# Patient Record
Sex: FEMALE | Race: WHITE | NOT HISPANIC OR LATINO
[De-identification: names, ages, dates, MRNs, and addresses within clinical notes are randomized per-mention and may not be internally consistent; named-entity substitution may affect disease eponyms.]

---

## 2019-03-03 ENCOUNTER — TRANSCRIPTION ENCOUNTER (OUTPATIENT)
Age: 39
End: 2019-03-03

## 2019-06-03 ENCOUNTER — APPOINTMENT (OUTPATIENT)
Dept: INTERNAL MEDICINE | Facility: CLINIC | Age: 39
End: 2019-06-03
Payer: COMMERCIAL

## 2019-06-03 VITALS
HEIGHT: 61 IN | DIASTOLIC BLOOD PRESSURE: 67 MMHG | RESPIRATION RATE: 12 BRPM | OXYGEN SATURATION: 98 % | SYSTOLIC BLOOD PRESSURE: 104 MMHG | BODY MASS INDEX: 27.19 KG/M2 | TEMPERATURE: 98.8 F | HEART RATE: 76 BPM | WEIGHT: 144 LBS

## 2019-06-03 PROCEDURE — 99395 PREV VISIT EST AGE 18-39: CPT

## 2019-06-05 LAB
25(OH)D3 SERPL-MCNC: 41.1 NG/ML
ALBUMIN SERPL ELPH-MCNC: 4.4 G/DL
ALP BLD-CCNC: 74 U/L
ALT SERPL-CCNC: 14 U/L
ANION GAP SERPL CALC-SCNC: 12 MMOL/L
APPEARANCE: CLEAR
AST SERPL-CCNC: 15 U/L
BILIRUB SERPL-MCNC: 0.4 MG/DL
BILIRUBIN URINE: NEGATIVE
BLOOD URINE: NEGATIVE
BUN SERPL-MCNC: 14 MG/DL
CALCIUM SERPL-MCNC: 9.1 MG/DL
CHLORIDE SERPL-SCNC: 104 MMOL/L
CHOLEST SERPL-MCNC: 150 MG/DL
CHOLEST/HDLC SERPL: 3.4 RATIO
CO2 SERPL-SCNC: 23 MMOL/L
COLOR: YELLOW
CREAT SERPL-MCNC: 0.89 MG/DL
ESTIMATED AVERAGE GLUCOSE: 100 MG/DL
GLUCOSE QUALITATIVE U: NEGATIVE
GLUCOSE SERPL-MCNC: 84 MG/DL
HBA1C MFR BLD HPLC: 5.1 %
HDLC SERPL-MCNC: 44 MG/DL
KETONES URINE: NEGATIVE
LDLC SERPL CALC-MCNC: 85 MG/DL
LEUKOCYTE ESTERASE URINE: NEGATIVE
NITRITE URINE: NEGATIVE
PH URINE: 5.5
POTASSIUM SERPL-SCNC: 3.8 MMOL/L
PROT SERPL-MCNC: 7.3 G/DL
PROTEIN URINE: NORMAL
SODIUM SERPL-SCNC: 139 MMOL/L
SPECIFIC GRAVITY URINE: 1.03
TRIGL SERPL-MCNC: 106 MG/DL
TSH SERPL-ACNC: 2.17 UIU/ML
UROBILINOGEN URINE: NORMAL
VIT B12 SERPL-MCNC: 328 PG/ML

## 2019-06-09 NOTE — ASSESSMENT
[FreeTextEntry1] : Physical\par blood work ordered\par Pt to schedule eulogio't with her gyn\par \par acetazolamide for altitude sickness prescribed\par \par follow up in one week for lab results\par

## 2019-06-09 NOTE — HISTORY OF PRESENT ILLNESS
[de-identified] : 40 yo female, presents for annual physical\par States she will be going to [place] for vacation and needs medicine for altitude prophylaxis\par

## 2019-06-09 NOTE — HEALTH RISK ASSESSMENT
[With Family] : lives with family [Employed] : employed [Single] : single [# Of Children ___] : has [unfilled] children [Sexually Active] : sexually active [] : No [PapSmearDate] : July 2018 [de-identified] : w [FreeTextEntry2] :

## 2019-06-13 DIAGNOSIS — D64.9 ANEMIA, UNSPECIFIED: ICD-10-CM

## 2019-06-14 PROBLEM — D64.9 ANEMIA: Status: ACTIVE | Noted: 2019-06-14

## 2019-06-14 LAB
BASOPHILS # BLD AUTO: 0.05 K/UL
BASOPHILS NFR BLD AUTO: 1.1 %
EOSINOPHIL # BLD AUTO: 0.04 K/UL
EOSINOPHIL NFR BLD AUTO: 0.9 %
HCT VFR BLD CALC: 36.7 %
HGB BLD-MCNC: 10.6 G/DL
IMM GRANULOCYTES NFR BLD AUTO: 0.2 %
LYMPHOCYTES # BLD AUTO: 1.2 K/UL
LYMPHOCYTES NFR BLD AUTO: 26.3 %
MAN DIFF?: NORMAL
MCHC RBC-ENTMCNC: 22.6 PG
MCHC RBC-ENTMCNC: 28.9 GM/DL
MCV RBC AUTO: 78.3 FL
MONOCYTES # BLD AUTO: 0.55 K/UL
MONOCYTES NFR BLD AUTO: 12.1 %
NEUTROPHILS # BLD AUTO: 2.71 K/UL
NEUTROPHILS NFR BLD AUTO: 59.4 %
PLATELET # BLD AUTO: 183 K/UL
RBC # BLD: 4.69 M/UL
RBC # FLD: 15.7 %
WBC # FLD AUTO: 4.56 K/UL

## 2020-10-26 ENCOUNTER — TRANSCRIPTION ENCOUNTER (OUTPATIENT)
Age: 40
End: 2020-10-26

## 2021-01-04 ENCOUNTER — LABORATORY RESULT (OUTPATIENT)
Age: 41
End: 2021-01-04

## 2021-01-04 ENCOUNTER — APPOINTMENT (OUTPATIENT)
Dept: INTERNAL MEDICINE | Facility: CLINIC | Age: 41
End: 2021-01-04
Payer: COMMERCIAL

## 2021-01-04 VITALS
OXYGEN SATURATION: 99 % | RESPIRATION RATE: 12 BRPM | WEIGHT: 151 LBS | BODY MASS INDEX: 28.53 KG/M2 | SYSTOLIC BLOOD PRESSURE: 115 MMHG | HEART RATE: 60 BPM | TEMPERATURE: 97.7 F | DIASTOLIC BLOOD PRESSURE: 74 MMHG

## 2021-01-04 DIAGNOSIS — Z00.00 ENCOUNTER FOR GENERAL ADULT MEDICAL EXAMINATION W/OUT ABNORMAL FINDINGS: ICD-10-CM

## 2021-01-04 PROCEDURE — 99395 PREV VISIT EST AGE 18-39: CPT

## 2021-01-04 PROCEDURE — 99072 ADDL SUPL MATRL&STAF TM PHE: CPT

## 2021-01-04 RX ORDER — ACETAZOLAMIDE 250 MG/1
250 TABLET ORAL TWICE DAILY
Qty: 30 | Refills: 0 | Status: DISCONTINUED | COMMUNITY
Start: 2019-06-03 | End: 2021-01-04

## 2021-01-05 NOTE — PHYSICAL EXAM
[No Acute Distress] : no acute distress [Well Nourished] : well nourished [Well Developed] : well developed [Well-Appearing] : well-appearing [Normal Sclera/Conjunctiva] : normal sclera/conjunctiva [PERRL] : pupils equal round and reactive to light [EOMI] : extraocular movements intact [Normal Outer Ear/Nose] : the outer ears and nose were normal in appearance [Normal Oropharynx] : the oropharynx was normal [Normal TMs] : both tympanic membranes were normal [Supple] : supple [No Respiratory Distress] : no respiratory distress  [No Accessory Muscle Use] : no accessory muscle use [Clear to Auscultation] : lungs were clear to auscultation bilaterally [Normal Rate] : normal rate  [Regular Rhythm] : with a regular rhythm [Normal S1, S2] : normal S1 and S2 [No Edema] : there was no peripheral edema [No Extremity Clubbing/Cyanosis] : no extremity clubbing/cyanosis [Soft] : abdomen soft [Non Tender] : non-tender [Non-distended] : non-distended [Normal Bowel Sounds] : normal bowel sounds [Normal Posterior Cervical Nodes] : no posterior cervical lymphadenopathy [Normal Anterior Cervical Nodes] : no anterior cervical lymphadenopathy [No CVA Tenderness] : no CVA  tenderness [No Spinal Tenderness] : no spinal tenderness [No Joint Swelling] : no joint swelling [Grossly Normal Strength/Tone] : grossly normal strength/tone [No Rash] : no rash [Coordination Grossly Intact] : coordination grossly intact [No Focal Deficits] : no focal deficits [Normal Gait] : normal gait [Normal Affect] : the affect was normal [Alert and Oriented x3] : oriented to person, place, and time [Normal Insight/Judgement] : insight and judgment were intact [No JVD] : no jugular venous distention [No Lymphadenopathy] : no lymphadenopathy [Thyroid Normal, No Nodules] : the thyroid was normal and there were no nodules present

## 2021-01-09 NOTE — HISTORY OF PRESENT ILLNESS
[de-identified] : \par Ms. SHAHBAZ COOPER is a 40 year old female presents for annual physical\par She is doing well. \par Denies SOB, chest pain, abdominal pain, N/V/D, leg swelling, headache, dizziness \par Offers no complaints\par She had botox last week, and was told at that time to have her thyroid checked.

## 2021-01-09 NOTE — ASSESSMENT
[FreeTextEntry1] : \par Annual Physical Exam\par UTD with GYN/ PAP\par complete bloodwork today, including STDs and COVID antibodies\par cont. exercise and healthy diet\par \par ?Enlarged Thyroid\par US Thyroid\par will check TSH today\par \par F/U 1 week for lab results

## 2021-01-09 NOTE — HEALTH RISK ASSESSMENT
[Patient reported PAP Smear was normal] : Patient reported PAP Smear was normal [With Family] : lives with family [Employed] : employed [# Of Children ___] : has [unfilled] children [] : No [PapSmearDate] : 2020 [FreeTextEntry2] :

## 2021-01-11 LAB
25(OH)D3 SERPL-MCNC: 32.2 NG/ML
ALBUMIN SERPL ELPH-MCNC: 4.6 G/DL
ALP BLD-CCNC: 86 U/L
ALT SERPL-CCNC: 18 U/L
ANION GAP SERPL CALC-SCNC: 19 MMOL/L
APPEARANCE: CLEAR
AST SERPL-CCNC: 32 U/L
BILIRUB SERPL-MCNC: 0.6 MG/DL
BILIRUBIN URINE: NEGATIVE
BLOOD URINE: NEGATIVE
BUN SERPL-MCNC: 12 MG/DL
C TRACH RRNA SPEC QL NAA+PROBE: NOT DETECTED
CALCIUM SERPL-MCNC: 9.3 MG/DL
CHLORIDE SERPL-SCNC: 101 MMOL/L
CHOLEST SERPL-MCNC: 172 MG/DL
CO2 SERPL-SCNC: 20 MMOL/L
COLOR: YELLOW
CREAT SERPL-MCNC: 1.16 MG/DL
ESTIMATED AVERAGE GLUCOSE: 88 MG/DL
GLUCOSE QUALITATIVE U: NEGATIVE
GLUCOSE SERPL-MCNC: 88 MG/DL
HAV IGM SER QL: NONREACTIVE
HBA1C MFR BLD HPLC: 4.7 %
HBV CORE IGM SER QL: NONREACTIVE
HBV SURFACE AG SER QL: NONREACTIVE
HCV AB SER QL: NONREACTIVE
HCV S/CO RATIO: 0.14 S/CO
HDLC SERPL-MCNC: 55 MG/DL
HIV1+2 AB SPEC QL IA.RAPID: NONREACTIVE
HSV 1+2 IGG SER IA-IMP: NEGATIVE
HSV 1+2 IGG SER IA-IMP: POSITIVE
HSV1 IGG SER QL: 5.22 INDEX
HSV1 IGM SER QL: NORMAL TITER
HSV2 AB FLD-ACNC: NORMAL TITER
HSV2 IGG SER QL: 0.62 INDEX
KETONES URINE: NEGATIVE
LDLC SERPL CALC-MCNC: 99 MG/DL
LEUKOCYTE ESTERASE URINE: NEGATIVE
N GONORRHOEA RRNA SPEC QL NAA+PROBE: NOT DETECTED
NITRITE URINE: NEGATIVE
NONHDLC SERPL-MCNC: 117 MG/DL
PH URINE: 6.5
POTASSIUM SERPL-SCNC: 4 MMOL/L
PROT SERPL-MCNC: 7.4 G/DL
PROTEIN URINE: NORMAL
SODIUM SERPL-SCNC: 139 MMOL/L
SOURCE TP AMPLIFICATION: NORMAL
SPECIFIC GRAVITY URINE: 1.03
T PALLIDUM AB SER QL IA: NEGATIVE
TRIGL SERPL-MCNC: 94 MG/DL
TSH SERPL-ACNC: 2.72 UIU/ML
UROBILINOGEN URINE: NORMAL
VIT B12 SERPL-MCNC: 456 PG/ML

## 2021-01-12 LAB
BASOPHILS # BLD AUTO: 0.02 K/UL
BASOPHILS NFR BLD AUTO: 0.4 %
EOSINOPHIL # BLD AUTO: 0.05 K/UL
EOSINOPHIL NFR BLD AUTO: 1 %
HCT VFR BLD CALC: 44.1 %
HGB BLD-MCNC: 14 G/DL
IMM GRANULOCYTES NFR BLD AUTO: 0.4 %
LYMPHOCYTES # BLD AUTO: 1.26 K/UL
LYMPHOCYTES NFR BLD AUTO: 25.9 %
MAN DIFF?: NORMAL
MCHC RBC-ENTMCNC: 29.7 PG
MCHC RBC-ENTMCNC: 31.7 GM/DL
MCV RBC AUTO: 93.4 FL
MONOCYTES # BLD AUTO: 0.43 K/UL
MONOCYTES NFR BLD AUTO: 8.8 %
NEUTROPHILS # BLD AUTO: 3.09 K/UL
NEUTROPHILS NFR BLD AUTO: 63.5 %
PLATELET # BLD AUTO: 146 K/UL
RBC # BLD: 4.72 M/UL
RBC # FLD: 13.7 %
WBC # FLD AUTO: 4.87 K/UL

## 2021-02-02 ENCOUNTER — TRANSCRIPTION ENCOUNTER (OUTPATIENT)
Age: 41
End: 2021-02-02

## 2021-02-05 ENCOUNTER — TRANSCRIPTION ENCOUNTER (OUTPATIENT)
Age: 41
End: 2021-02-05

## 2022-01-05 ENCOUNTER — APPOINTMENT (OUTPATIENT)
Dept: INTERNAL MEDICINE | Facility: CLINIC | Age: 42
End: 2022-01-05
Payer: COMMERCIAL

## 2022-01-05 VITALS
RESPIRATION RATE: 12 BRPM | DIASTOLIC BLOOD PRESSURE: 68 MMHG | WEIGHT: 151.57 LBS | BODY MASS INDEX: 28.62 KG/M2 | TEMPERATURE: 97.5 F | SYSTOLIC BLOOD PRESSURE: 103 MMHG | HEART RATE: 85 BPM | OXYGEN SATURATION: 99 % | HEIGHT: 61 IN

## 2022-01-05 DIAGNOSIS — J02.9 ACUTE PHARYNGITIS, UNSPECIFIED: ICD-10-CM

## 2022-01-05 DIAGNOSIS — J03.90 ACUTE TONSILLITIS, UNSPECIFIED: ICD-10-CM

## 2022-01-05 DIAGNOSIS — Z23 ENCOUNTER FOR IMMUNIZATION: ICD-10-CM

## 2022-01-05 PROCEDURE — 99214 OFFICE O/P EST MOD 30 MIN: CPT

## 2022-01-05 RX ORDER — LEVOFLOXACIN 500 MG/1
500 TABLET, FILM COATED ORAL DAILY
Qty: 7 | Refills: 0 | Status: ACTIVE | COMMUNITY
Start: 2022-01-05 | End: 1900-01-01

## 2022-01-05 NOTE — PLAN
[FreeTextEntry1] : Acute\par \par Sore throat/ Tonsillitis \par  salt water gargles\par Rapid strep test today negative\par Levaquin 500 mg QD \par \par

## 2022-01-05 NOTE — HISTORY OF PRESENT ILLNESS
[de-identified] : 41 year old female with no significant medical history presents for acute visit\par \par She has been having sore throat since Monday> getting worse . Denies fevers, chills, N/V or abdominal pain> She reports having recent negative COVID test \par \par Denies any CP, SOB, or palpitations

## 2022-01-05 NOTE — PHYSICAL EXAM
[No Acute Distress] : no acute distress [Well Nourished] : well nourished [Normal Sclera/Conjunctiva] : normal sclera/conjunctiva [PERRL] : pupils equal round and reactive to light [EOMI] : extraocular movements intact [Normal Outer Ear/Nose] : the outer ears and nose were normal in appearance [No JVD] : no jugular venous distention [No Lymphadenopathy] : no lymphadenopathy [Supple] : supple [Thyroid Normal, No Nodules] : the thyroid was normal and there were no nodules present [No Respiratory Distress] : no respiratory distress  [No Accessory Muscle Use] : no accessory muscle use [Clear to Auscultation] : lungs were clear to auscultation bilaterally [Normal Rate] : normal rate  [Regular Rhythm] : with a regular rhythm [Normal S1, S2] : normal S1 and S2 [No Murmur] : no murmur heard [No Carotid Bruits] : no carotid bruits [No Abdominal Bruit] : a ~M bruit was not heard ~T in the abdomen [No Varicosities] : no varicosities [Pedal Pulses Present] : the pedal pulses are present [No Edema] : there was no peripheral edema [No Palpable Aorta] : no palpable aorta [No Extremity Clubbing/Cyanosis] : no extremity clubbing/cyanosis [Soft] : abdomen soft [Non Tender] : non-tender [Non-distended] : non-distended [Normal Bowel Sounds] : normal bowel sounds [Normal Posterior Cervical Nodes] : no posterior cervical lymphadenopathy [Normal Anterior Cervical Nodes] : no anterior cervical lymphadenopathy [No CVA Tenderness] : no CVA  tenderness [No Spinal Tenderness] : no spinal tenderness [No Joint Swelling] : no joint swelling [Grossly Normal Strength/Tone] : grossly normal strength/tone [No Rash] : no rash [Coordination Grossly Intact] : coordination grossly intact [No Focal Deficits] : no focal deficits [Normal Gait] : normal gait [Deep Tendon Reflexes (DTR)] : deep tendon reflexes were 2+ and symmetric [Normal Affect] : the affect was normal [Normal Insight/Judgement] : insight and judgment were intact [de-identified] : + injected oropharynx/ + enlarged tonsills

## 2023-08-10 ENCOUNTER — APPOINTMENT (OUTPATIENT)
Dept: OTOLARYNGOLOGY | Facility: CLINIC | Age: 43
End: 2023-08-10
Payer: COMMERCIAL

## 2023-08-10 PROCEDURE — 99203 OFFICE O/P NEW LOW 30 MIN: CPT

## 2023-08-10 NOTE — HISTORY OF PRESENT ILLNESS
[de-identified] : 43 years old female patient with history of Persistent tonsil stones and throat pain for the past couple of years.   Patient is present today in the office with bilateral deep cryptic tonsils with tonsil stones > to the right side.

## 2023-08-10 NOTE — REASON FOR VISIT
[Initial Evaluation] : an initial evaluation for [FreeTextEntry2] : Persistent tonsil stones and throat pain for the past couple of years. Patient states her levle of severity is a levle 10 out of 10 and it occurs constant.  Patient states nothing helps to improve or worsens her  Persistent tonsil stones and throat pain for the past couple of years.

## 2023-08-10 NOTE — PHYSICAL EXAM
[Normal] : no abnormal secretions [de-identified] :  bilateral deep cryptic tonsils with tonsil stones > to the right side.

## 2023-08-16 ENCOUNTER — APPOINTMENT (OUTPATIENT)
Dept: OTOLARYNGOLOGY | Facility: CLINIC | Age: 43
End: 2023-08-16
Payer: COMMERCIAL

## 2023-08-16 VITALS
OXYGEN SATURATION: 100 % | TEMPERATURE: 98.1 F | SYSTOLIC BLOOD PRESSURE: 131 MMHG | HEIGHT: 60 IN | BODY MASS INDEX: 25.52 KG/M2 | DIASTOLIC BLOOD PRESSURE: 71 MMHG | WEIGHT: 130 LBS | HEART RATE: 77 BPM

## 2023-08-16 DIAGNOSIS — J35.1 HYPERTROPHY OF TONSILS: ICD-10-CM

## 2023-08-16 PROCEDURE — 42826 REMOVAL OF TONSILS: CPT | Mod: 52,RT

## 2023-08-16 RX ORDER — ACETAMINOPHEN AND CODEINE PHOSPHATE 300; 30 MG/1; MG/1
300-30 TABLET ORAL
Qty: 20 | Refills: 0 | Status: ACTIVE | COMMUNITY
Start: 2023-08-16 | End: 1900-01-01

## 2023-08-16 RX ORDER — AZITHROMYCIN 250 MG/1
250 TABLET, FILM COATED ORAL
Qty: 1 | Refills: 0 | Status: ACTIVE | COMMUNITY
Start: 2023-08-16 | End: 1900-01-01

## 2023-08-16 RX ORDER — METHYLPREDNISOLONE 4 MG/1
4 TABLET ORAL
Qty: 1 | Refills: 0 | Status: ACTIVE | COMMUNITY
Start: 2023-08-16 | End: 1900-01-01

## 2023-08-16 RX ORDER — DOCUSATE SODIUM 100 MG/1
100 CAPSULE ORAL TWICE DAILY
Qty: 60 | Refills: 0 | Status: ACTIVE | COMMUNITY
Start: 2023-08-16 | End: 1900-01-01

## 2023-08-16 NOTE — PROCEDURE
[FreeTextEntry1] :   Right side laser assisted tonsil ablation. (Diode Laser) [FreeTextEntry3] :   Right side laser assisted tonsil ablation. . (Diode Laser)

## 2023-08-16 NOTE — PHYSICAL EXAM
[Normal] : no rashes [de-identified] :  bilateral deep cryptic tonsils with tonsil stones > to the right side.

## 2023-08-16 NOTE — REASON FOR VISIT
[Subsequent Evaluation] : a subsequent evaluation for [FreeTextEntry2] : Persistent  right side tonsil stones and throat pain for the past couple of years.

## 2023-09-12 ENCOUNTER — APPOINTMENT (OUTPATIENT)
Dept: OTOLARYNGOLOGY | Facility: CLINIC | Age: 43
End: 2023-09-12
Payer: COMMERCIAL

## 2023-09-12 VITALS
HEART RATE: 77 BPM | OXYGEN SATURATION: 99 % | BODY MASS INDEX: 25.32 KG/M2 | WEIGHT: 129 LBS | TEMPERATURE: 98 F | HEIGHT: 60 IN | DIASTOLIC BLOOD PRESSURE: 67 MMHG | SYSTOLIC BLOOD PRESSURE: 101 MMHG

## 2023-09-12 DIAGNOSIS — G89.29 PAIN IN THROAT: ICD-10-CM

## 2023-09-12 DIAGNOSIS — J35.8 OTHER CHRONIC DISEASES OF TONSILS AND ADENOIDS: ICD-10-CM

## 2023-09-12 DIAGNOSIS — R07.0 PAIN IN THROAT: ICD-10-CM

## 2023-09-12 PROCEDURE — 99024 POSTOP FOLLOW-UP VISIT: CPT

## 2023-09-13 ENCOUNTER — APPOINTMENT (OUTPATIENT)
Dept: INTERNAL MEDICINE | Facility: CLINIC | Age: 43
End: 2023-09-13
Payer: COMMERCIAL

## 2023-09-13 VITALS
OXYGEN SATURATION: 96 % | HEIGHT: 60 IN | BODY MASS INDEX: 25.52 KG/M2 | DIASTOLIC BLOOD PRESSURE: 80 MMHG | RESPIRATION RATE: 12 BRPM | WEIGHT: 130 LBS | SYSTOLIC BLOOD PRESSURE: 120 MMHG | HEART RATE: 76 BPM

## 2023-09-13 DIAGNOSIS — T75.3XXA MOTION SICKNESS, INITIAL ENCOUNTER: ICD-10-CM

## 2023-09-13 DIAGNOSIS — Z00.00 ENCOUNTER FOR GENERAL ADULT MEDICAL EXAMINATION W/OUT ABNORMAL FINDINGS: ICD-10-CM

## 2023-09-13 PROCEDURE — G0444 DEPRESSION SCREEN ANNUAL: CPT | Mod: 59

## 2023-09-13 PROCEDURE — 99396 PREV VISIT EST AGE 40-64: CPT

## 2023-09-13 RX ORDER — MECLIZINE HYDROCHLORIDE 12.5 MG/1
12.5 TABLET ORAL 3 TIMES DAILY
Qty: 30 | Refills: 1 | Status: ACTIVE | COMMUNITY
Start: 2023-09-13 | End: 1900-01-01

## 2023-09-13 RX ORDER — ONDANSETRON 4 MG/1
4 TABLET ORAL EVERY 6 HOURS
Qty: 20 | Refills: 3 | Status: ACTIVE | COMMUNITY
Start: 2023-09-13 | End: 1900-01-01

## 2023-09-13 RX ORDER — SCOPOLAMINE 1.5 MG/1
1 PATCH, EXTENDED RELEASE TRANSDERMAL
Qty: 1 | Refills: 3 | Status: ACTIVE | COMMUNITY
Start: 2023-09-13 | End: 1900-01-01

## 2023-09-24 LAB
25(OH)D3 SERPL-MCNC: 32.9 NG/ML
ALBUMIN SERPL ELPH-MCNC: 4.2 G/DL
ALP BLD-CCNC: 73 U/L
ALT SERPL-CCNC: 17 U/L
ANION GAP SERPL CALC-SCNC: 11 MMOL/L
APPEARANCE: CLEAR
AST SERPL-CCNC: 23 U/L
BASOPHILS # BLD AUTO: 0.04 K/UL
BASOPHILS NFR BLD AUTO: 0.9 %
BILIRUB SERPL-MCNC: 0.5 MG/DL
BILIRUBIN URINE: NEGATIVE
BLOOD URINE: NEGATIVE
BUN SERPL-MCNC: 13 MG/DL
CALCIUM SERPL-MCNC: 8.9 MG/DL
CHLORIDE SERPL-SCNC: 101 MMOL/L
CHOLEST SERPL-MCNC: 135 MG/DL
CO2 SERPL-SCNC: 24 MMOL/L
COLOR: YELLOW
CREAT SERPL-MCNC: 0.77 MG/DL
EGFR: 99 ML/MIN/1.73M2
EOSINOPHIL # BLD AUTO: 0.03 K/UL
EOSINOPHIL NFR BLD AUTO: 0.7 %
ESTIMATED AVERAGE GLUCOSE: 103 MG/DL
GLUCOSE QUALITATIVE U: NEGATIVE MG/DL
GLUCOSE SERPL-MCNC: 89 MG/DL
HBA1C MFR BLD HPLC: 5.2 %
HCT VFR BLD CALC: 44.7 %
HDLC SERPL-MCNC: 50 MG/DL
HGB BLD-MCNC: 14.5 G/DL
IMM GRANULOCYTES NFR BLD AUTO: 0 %
KETONES URINE: NEGATIVE MG/DL
LDLC SERPL CALC-MCNC: 76 MG/DL
LEUKOCYTE ESTERASE URINE: NEGATIVE
LYMPHOCYTES # BLD AUTO: 1.18 K/UL
LYMPHOCYTES NFR BLD AUTO: 27.8 %
MAN DIFF?: NORMAL
MCHC RBC-ENTMCNC: 28.8 PG
MCHC RBC-ENTMCNC: 32.4 GM/DL
MCV RBC AUTO: 88.7 FL
MONOCYTES # BLD AUTO: 0.38 K/UL
MONOCYTES NFR BLD AUTO: 8.9 %
NEUTROPHILS # BLD AUTO: 2.62 K/UL
NEUTROPHILS NFR BLD AUTO: 61.7 %
NITRITE URINE: NEGATIVE
NONHDLC SERPL-MCNC: 85 MG/DL
PH URINE: 6.5
PLATELET # BLD AUTO: 170 K/UL
POTASSIUM SERPL-SCNC: 3.7 MMOL/L
PROT SERPL-MCNC: 6.9 G/DL
PROTEIN URINE: NEGATIVE MG/DL
RBC # BLD: 5.04 M/UL
RBC # FLD: 15 %
SODIUM SERPL-SCNC: 136 MMOL/L
SPECIFIC GRAVITY URINE: 1.02
TRIGL SERPL-MCNC: 35 MG/DL
TSH SERPL-ACNC: 2.66 UIU/ML
UROBILINOGEN URINE: 0.2 MG/DL
VIT B12 SERPL-MCNC: 548 PG/ML
WBC # FLD AUTO: 4.25 K/UL

## 2024-02-27 ENCOUNTER — APPOINTMENT (OUTPATIENT)
Dept: ORTHOPEDIC SURGERY | Facility: CLINIC | Age: 44
End: 2024-02-27
Payer: COMMERCIAL

## 2024-02-27 VITALS — HEIGHT: 60 IN | BODY MASS INDEX: 25.72 KG/M2 | WEIGHT: 131 LBS

## 2024-02-27 DIAGNOSIS — M25.512 PAIN IN LEFT SHOULDER: ICD-10-CM

## 2024-02-27 DIAGNOSIS — G89.29 PAIN IN LEFT SHOULDER: ICD-10-CM

## 2024-02-27 PROCEDURE — 99204 OFFICE O/P NEW MOD 45 MIN: CPT

## 2024-02-27 RX ORDER — CELECOXIB 200 MG/1
200 CAPSULE ORAL
Qty: 30 | Refills: 0 | Status: ACTIVE | COMMUNITY
Start: 2024-02-27 | End: 1900-01-01

## 2024-02-27 NOTE — PHYSICAL EXAM
[de-identified] : Left shoulder exam patient has an obvious AC separation probably grade 3.  She is neurovascular intact in the left upper extremity with good cuff strength with testing is resisted oh for elevation and abduction.  For the right knee is concerned patient has clinical exam with a positive Flores sign.  There is soft tissue swelling the knee is stable to AP stress varus valgus stress no effusion noted neurovascular intact distally.   Right elbow exam is tender to palpation over the lateral epicondyle range of motion of the elbow is full with no evidence of instability in full extension or 90 degrees of flexion.  Neurovascular intact distally.

## 2024-02-27 NOTE — DISCUSSION/SUMMARY
[de-identified] : Patient I discussed all 3 of her issues independently.  The first is dealing with her left shoulder she has an obvious deformity consistent with AC grade 3 separation.  She will be in turn referred to Dr. Howell our shoulder specialist [facility] for further evaluation and treatment I briefly talked to her about the potential need to consider a surgical intervention to correct this for her.  As far as the right elbow is concerned patient has clinical exam and history all consistent with diagnosis of lateral epicondylitis.  We will place patient on Celebrex 200 mg p.o. twice daily for 5-day course then to be taken thereafter as needed resource medicine medication discussed in detail including potential side effects.  Reviewed patient's current medication profile appears to be no relative contraindication to his current limited use.  Patient not sustained symptomatic improvement with the Celebrex regimen she will return in 10 days to 2 weeks for cortisone injection.  As far as right knee is concerned patient has a clinical exam history highly suggestive of a medial meniscal tear.  That fact we will be sending the patient to an MRI to evaluate the meniscal structure on the medial aspect of the knee she will follow-up once that is available for review.  This consultation lasted 50 minutes

## 2024-02-27 NOTE — HISTORY OF PRESENT ILLNESS
[de-identified] : First-time visit for this [place] .  She is here with 3 very distinct complaints.  The first is that of left shoulder deformity and pain patient states that she has noticed for few years now left shoulder pain and swelling she had seen another orthopedic surgeon about 2 years ago indicates need to have an operation.  Patient did not undergo surgery because she had had some foot surgery but is here with continued pain in the left shoulder feeling of something being unstable and also a visible bump.  Patient also complaining of right knee pain she has medial sided knee pain with occasional locking sensation.  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  Patient states that the knee was significantly swollen has intermittent bouts of sharp medial sided discomfort.  I patient also complaining of right elbow pain she states has pain the lateral aspect of the elbow it may be related to activities at the gym but she is not sure she does not have any specific accident injury or initiating traumatic event.  Sometimes severe.

## 2024-02-27 NOTE — REASON FOR VISIT
[Initial Visit] : an initial visit for [Knee Pain] : knee pain [Other: ____] : [unfilled] [FreeTextEntry2] : SHE IS COMPLAINING OF RIGHT KNEE SHARP, RIGHT ELBOW DULL AND SHARP AND LEFT SHOULDER ACHY PAIN. right knee pain , x rays were negative take in hosptial after injury

## 2024-03-06 ENCOUNTER — APPOINTMENT (OUTPATIENT)
Dept: ORTHOPEDIC SURGERY | Facility: CLINIC | Age: 44
End: 2024-03-06
Payer: COMMERCIAL

## 2024-03-06 DIAGNOSIS — S43.102A UNSPECIFIED DISLOCATION OF LEFT ACROMIOCLAVICULAR JOINT, INITIAL ENCOUNTER: ICD-10-CM

## 2024-03-06 DIAGNOSIS — S49.92XA UNSPECIFIED INJURY OF LEFT SHOULDER AND UPPER ARM, INITIAL ENCOUNTER: ICD-10-CM

## 2024-03-06 PROCEDURE — 99214 OFFICE O/P EST MOD 30 MIN: CPT

## 2024-03-06 PROCEDURE — 99204 OFFICE O/P NEW MOD 45 MIN: CPT

## 2024-03-06 PROCEDURE — 73030 X-RAY EXAM OF SHOULDER: CPT | Mod: LT

## 2024-03-06 NOTE — HISTORY OF PRESENT ILLNESS
[de-identified] : LOCATION: LEFT SHOULDER-RHD  DURATION: PATIENT FELL DECEMBER 15, 2021 - PT WAS AT WORK [employer], PT WAS PUSHED AND FELL FORWARD LANDED ON OUTSTRETCHED LEFT ARM SEEN IN ER - XRAYS NOT PROKEN NO FURTHER TREATMENT  MARCH 2022 - HAD MRI  - NO REPORT  SURGERY WAS OFFERED - DID NOT PROCEED SHOULDER WAS NOT PAINFUL AND WAS FUNTIONAL FLARED UP AUGUST 2023 - FROM WORKING OUT / LIFTING   INTERMITTENT PAIN ANTERIOR SHOULDER  PAIN LEVEL:5/10 WITH LIFTING  TREATMENTS: REST AGGRAVATING FACTORS: OVER HEAD LIFTING, REACHING BEHIND, EXERCISING  ASSOCIATED CLICKING/LOCKING/POPPING/GRINDING  HAS HAD 12 VISITS P.T. 2023  WITH NO RELIEF OF SHOULDER PAIN

## 2024-03-06 NOTE — DISCUSSION/SUMMARY
[de-identified] : DECEMBER 2021 - TYPE 2 AC SEPARATION  WORSE SICE AUGUST 2023 - FROM WORKING OUT / LIFTING  HAS HAD 12 VISITS P.T. 2023  WITH NO RELIEF OF SHOULDER PAIN

## 2024-03-06 NOTE — PHYSICAL EXAM
[de-identified] : PHYSICAL EXAM LEFT  SHOULDER  NECK EXAM  FROM NONTENDER  SPURLING  RIGHT=NEG, LEFT=NEG  NORMAL POSTURE / SCAPULAR PROTRACTION AROM 150 / 150 / 90 / 30 TENDER: SA REGION ANTERIO  AC JOINT NOT TENDER  SPECIAL TESTING : WEAVER - POSITIVE  ABENA - POSITIVE  SPEED TEST - POSITIVE  CONN - NEGATIVE  APPREHENSION AND SUPPRESSION - NEGATIVE   RC STRENGTH TESTING  SS:  5/5 SUB 5/5 IS     5/5 BICEPS  5/5  SENSATION  - GROSSLY INTACT    [de-identified] : LEFT SHOULDER XRAY (2 VIEWS - AP AND OUTLET) -   NO OBVIOUS FRACTURE , SEPARATION OR DISLOCATION  NO SIGNIFICANT OSTEOARTHRITIS,  TYPE 2B ACROMION  CSA=

## 2024-03-14 ENCOUNTER — APPOINTMENT (OUTPATIENT)
Dept: ORTHOPEDIC SURGERY | Facility: CLINIC | Age: 44
End: 2024-03-14
Payer: COMMERCIAL

## 2024-03-14 DIAGNOSIS — Z87.39 PERSONAL HISTORY OF OTHER DISEASES OF THE MUSCULOSKELETAL SYSTEM AND CONNECTIVE TISSUE: ICD-10-CM

## 2024-03-14 PROCEDURE — 99215 OFFICE O/P EST HI 40 MIN: CPT

## 2024-03-14 RX ORDER — METHYLPREDNISOLONE 4 MG/1
4 TABLET ORAL
Qty: 1 | Refills: 1 | Status: ACTIVE | COMMUNITY
Start: 2024-03-14 | End: 1900-01-01

## 2024-03-14 NOTE — PROCEDURE
[de-identified] : Patient was given a cortisone injection into the right elbow in the area of greatest tenderness of the lateral condyle.  Patient tolerated injection well.

## 2024-03-14 NOTE — PHYSICAL EXAM
[de-identified] : Left shoulder exam patient has an obvious AC separation probably grade 3.  She is neurovascular intact in the left upper extremity with good cuff strength with testing is resisted oh for elevation and abduction.  For the right knee is concerned patient has clinical exam with a positive Flores sign.  There is soft tissue swelling the knee is stable to AP stress varus valgus stress no effusion noted neurovascular intact distally.   Right elbow exam is tender to palpation over the lateral epicondyle range of motion of the elbow is full with no evidence of instability in full extension or 90 degrees of flexion.  Neurovascular intact distally.

## 2024-03-14 NOTE — HISTORY OF PRESENT ILLNESS
[de-identified] : Patient returns today with the results of her knee MRI the indication is that she has patella tendinitis.  She is also complaining of right elbow pain.  Patient given tentative diagnosis of elbow lateral epicondylitis on the last visit she has not improved either the knee or shoulder with the previously prescribed Celebrex.  She is here for repeat evaluation and to review the MRI results.

## 2024-03-14 NOTE — DISCUSSION/SUMMARY
[de-identified] : Patient I discussed the findings of the MRI as well as her symptoms regarding her shoulder as far as both are concerned patient be prescribed a Aircast for both she is given cortisone injection to the right elbow she will be placed on a Medrol Dosepak to help reduce her symptoms for her upcoming trip to [facility].  Resource medicine medication discussed in detail including typical dosing regimen and potential side effects.  We reviewed her current medication profile appears to be normal to convocation to his current limited use.  Patient will follow-up after her trip to [facility] if not thoroughly improved.  This consultation lasted 45 minutes.

## 2024-03-27 ENCOUNTER — APPOINTMENT (OUTPATIENT)
Dept: ORTHOPEDIC SURGERY | Facility: CLINIC | Age: 44
End: 2024-03-27

## 2024-03-27 DIAGNOSIS — S43.432A SUPERIOR GLENOID LABRUM LESION OF LEFT SHOULDER, INITIAL ENCOUNTER: ICD-10-CM

## 2024-03-27 DIAGNOSIS — S49.92XS UNSPECIFIED INJURY OF LEFT SHOULDER AND UPPER ARM, SEQUELA: ICD-10-CM

## 2024-03-27 PROCEDURE — 99213 OFFICE O/P EST LOW 20 MIN: CPT | Mod: 25

## 2024-03-27 PROCEDURE — 20611 DRAIN/INJ JOINT/BURSA W/US: CPT

## 2024-03-28 ENCOUNTER — APPOINTMENT (OUTPATIENT)
Dept: INTERNAL MEDICINE | Facility: CLINIC | Age: 44
End: 2024-03-28

## 2024-04-11 ENCOUNTER — APPOINTMENT (OUTPATIENT)
Dept: ORTHOPEDIC SURGERY | Facility: CLINIC | Age: 44
End: 2024-04-11
Payer: COMMERCIAL

## 2024-04-11 DIAGNOSIS — M25.561 PAIN IN RIGHT KNEE: ICD-10-CM

## 2024-04-11 PROCEDURE — 99213 OFFICE O/P EST LOW 20 MIN: CPT

## 2024-04-11 NOTE — HISTORY OF PRESENT ILLNESS
[de-identified] : Patient returns today her main complaint is right knee pain.  Patient states that she has a feeling of tightness in her hamstrings and that she believes this is causing some pain anterior in the knee.  Patient has noted this pain especially when she is laying down in bed and doing some hamstring stretching exercises.  She has not responded as well as we would like to do previous anti-inflammatory medication.  She is also seeing our shoulder specialist regarding her left shoulder.

## 2024-04-11 NOTE — REASON FOR VISIT
[Follow-Up Visit] : a follow-up visit for [Other: ____] : [unfilled] [FreeTextEntry2] : RIGHT TENNIS ELBOW. GIVEN MEDROL AND PT LAST VISIT

## 2024-04-11 NOTE — DISCUSSION/SUMMARY
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  Patient not been able to do physical therapy of the right knee because the PT area has been focusing on her left shoulder.  I will spoke at length with the patient about very specific exercise to be done at the gym with the leg extension machine and stationary bike in order to help improve quad tone specifically the VMO.  Patient with this for about 5 to 6 weeks follow-up with me in 5 to 6 weeks if not thoroughly improved.  Today's consultation was 25 minutes.

## 2024-04-11 NOTE — PHYSICAL EXAM
[facility] OPI Progress Note    Date: 2019    Name: Maryellen Washington    MRN: [id number]         : 1950      Ms. Hoff arrived to [facility] Tori Mitchell (MRN [id number]   Ref. Range 10/29/patient. Patient verbalized underrstanding    Ms. Hoff was discharged from [place] [id number] in stable condition at 1345. She is to return on at 10/31/19 at 1 pm  for her next day 3 etoposide appointment.     Alivia Trevino NADIA Barillas  October 30, 2019 [de-identified] : Right knee exam today is fairly benign patient has some minimal tenderness compression of the patella tendon as well as patellofemoral articulation range of motion 0 to 125 degrees knee stable extra stress valgus stress in both extension and 90 degrees of flexion.  There is a moderate amount of quadriceps atrophy of the right compared to the left with near complete absence of the VMO.

## 2024-05-09 ENCOUNTER — APPOINTMENT (OUTPATIENT)
Dept: ORTHOPEDIC SURGERY | Facility: CLINIC | Age: 44
End: 2024-05-09

## 2024-05-09 DIAGNOSIS — M76.50 PATELLAR TENDINITIS, UNSPECIFIED KNEE: ICD-10-CM

## 2024-05-29 ENCOUNTER — APPOINTMENT (OUTPATIENT)
Dept: ORTHOPEDIC SURGERY | Facility: CLINIC | Age: 44
End: 2024-05-29

## 2024-05-29 DIAGNOSIS — M75.112 INCOMPLETE ROTATOR CUFF TEAR OR RUPTURE OF LEFT SHOULDER, NOT SPECIFIED AS TRAUMATIC: ICD-10-CM

## 2024-05-29 DIAGNOSIS — M19.019 PRIMARY OSTEOARTHRITIS, UNSPECIFIED SHOULDER: ICD-10-CM

## 2024-05-29 PROCEDURE — 99213 OFFICE O/P EST LOW 20 MIN: CPT | Mod: 25

## 2024-05-29 PROCEDURE — 20611 DRAIN/INJ JOINT/BURSA W/US: CPT | Mod: LT

## 2024-05-29 NOTE — DISCUSSION/SUMMARY
[de-identified] : PARTIAL ROTATOR CUFF TEAR  WITH POSITIVE IMPINGEMENT SIGN  DISTAL CLAVICLE OSTEOLYSIS  EQUIVICAL SLAP TEAR    NO MORE KENALOG    CONSIDER PRP CONSIDER ARTHROSCOPIC TREATMENT FOR PARTIAL TEAR RESECTION OF ACROMIAL SPUR  AND DCO

## 2024-05-29 NOTE — PROCEDURE
[de-identified] : INJECTION LEFT SHOULDER SA SPACE  Patient has demonstrated limited relief from NSAIDS, rest, exercises / PT, and after discussion of the risks and benefits, the patient has elected to proceed with an ULTRASOUND GUIDED injection into the LEFT SUBACROMIAL  SPACE LATERAL APPROACH    Confirmed that the patient does not have history of prior adverse reactions, active, infections, or relevant allergies. There was no effusion, erythema, or warmth, and the skin was clear  The skin was sterilized with alcohol. Ethyl Chloride was used as a topical anesthetic. Routine sterile technique.  The site was injected UTILIZING ULTRASOUND GUIDANCE to confirm appropriate placement of the needle- with a mixture of medication and local anesthetic. The injection was completed without complication and a bandage was applied.   The patient tolerated the procedure well and was given post-injection instructions.Rec: Cold therapy, analgesics, avoid heavy activity. MEDICATION: 4cc of 1% xylocaine + 10mg of Kenalog LOT# [id number]  EXP 08/24

## 2024-05-29 NOTE — PROCEDURE
[de-identified] : INJECTION LEFT SHOULDER SA SPACE  Patient has demonstrated limited relief from NSAIDS, rest, exercises / PT, and after discussion of the risks and benefits, the patient has elected to proceed with an ULTRASOUND GUIDED injection into the LEFT SUBACROMIAL  SPACE LATERAL APPROACH    Confirmed that the patient does not have history of prior adverse reactions, active, infections, or relevant allergies. There was no effusion, erythema, or warmth, and the skin was clear  The skin was sterilized with alcohol. Ethyl Chloride was used as a topical anesthetic. Routine sterile technique.  The site was injected UTILIZING ULTRASOUND GUIDANCE to confirm appropriate placement of the needle- with a mixture of medication and local anesthetic. The injection was completed without complication and a bandage was applied.   The patient tolerated the procedure well and was given post-injection instructions.Rec: Cold therapy, analgesics, avoid heavy activity. MEDICATION: 4cc of 1% xylocaine + 10mg of Kenalog LOT# [id number]  EXP 08/24

## 2024-05-29 NOTE — PHYSICAL EXAM
[de-identified] : PHYSICAL EXAM LEFT  SHOULDER  NECK EXAM  FROM NONTENDER  SPURLING  RIGHT=NEG, LEFT=NEG  NORMAL POSTURE / SCAPULAR PROTRACTION AROM 150 / 150 / 90 / 30 TENDER: SA REGION ANTERIO  AC JOINT NOT TENDER  SPECIAL TESTING : WEAVER - POSITIVE  ABENA - POSITIVE  SPEED TEST -NEGATIVE   CONN - NEGATIVE  APPREHENSION AND SUPPRESSION - NEGATIVE   RC STRENGTH TESTING  SS:  5/5 SUB 5/5 IS     5/5 BICEPS  5/5  SENSATION  - GROSSLY INTACT    [de-identified] : Date of Exam: 03-   EXAM:  MRI LEFT SHOULDER WITHOUT CONTRAST    IMPRESSION:    1. Findings in the AC joint include marrow edema in the clavicular head with tiny cysts and erosions on the articular surface, synovitis in the AC joint, widening of the joint space. Consider diagnosis of the clavicular "osteolysis" from repetitive exercise or trauma. 2. The undersurface of the acromion is flat. The coracoacromial ligament is mildly thickened, without significant subacromial enthesophyte formation on the acromial rim. The underlying bursa is nonetheless mildly thickened and inflamed. 3. Study negative for partial or full-thickness rotator cuff tear. There is mild tendinosis in the supraspinatus tendon, with some intrasubstance delamination in the mid to distal tendon. There is marked fraying of the tendon's bursal surface. 4. Type IIb SLAP tear, extending approximately midway down the posterior glenoid rim.

## 2024-05-29 NOTE — HISTORY OF PRESENT ILLNESS
[de-identified] : BENNIE URBAN FOLLOW UP  MRI RESULTS TODAY     PREVIOUS HPI LOCATION: LEFT SHOULDER-RHD  DURATION: PATIENT FELL DECEMBER 15, 2021 - PT WAS AT WORK [employer], PT WAS PUSHED AND FELL FORWARD LANDED ON OUTSTRETCHED LEFT ARM SEEN IN ER - XRAYS NOT PROKEN NO FURTHER TREATMENT  MARCH 2022 - HAD MRI  - NO REPORT  SURGERY WAS OFFERED - DID NOT PROCEED SHOULDER WAS NOT PAINFUL AND WAS FUNTIONAL FLARED UP AUGUST 2023 - FROM WORKING OUT / LIFTING   INTERMITTENT PAIN ANTERIOR SHOULDER  PAIN LEVEL:5/10 WITH LIFTING  TREATMENTS: REST AGGRAVATING FACTORS: OVER HEAD LIFTING, REACHING BEHIND, EXERCISING  ASSOCIATED CLICKING/LOCKING/POPPING/GRINDING  HAS HAD 12 VISITS P.T. 2023  WITH NO RELIEF OF SHOULDER PAIN

## 2024-05-29 NOTE — PHYSICAL EXAM
[de-identified] : PHYSICAL EXAM LEFT  SHOULDER    NORMAL POSTURE / SCAPULAR PROTRACTION AROM 150 / 150 / 90 / 30 TENDER: SA REGION ANTERIOR   AC JOINT NOT TENDER  SPECIAL TESTING : WEAVER - POSITIVE  ABENA - POSITIVE  SPEED TEST -NEGATIVE   CONN - NEGATIVE  APPREHENSION AND SUPPRESSION - NEGATIVE   RC STRENGTH TESTING  SS:  5/5 SUB 5/5 IS     5/5 BICEPS  5/5  SENSATION  - GROSSLY INTACT

## 2024-05-29 NOTE — DISCUSSION/SUMMARY
[de-identified] : PARTIAL ROTATOR CUFF TEAR  WITH POSITIVE IMPINGEMENT SIGN  DISTAL CLAVICLE OSTEOLYSIS  EQUIVICAL SLAP TEAR    CONSIDER 2ND  KENALOG INJECTION AND P.T.   CONSIDER ARTHROSCOPIC TREATMENT FOR PARTIAL TEAR RESECTION OF ACROMIAL SPUR  AND DCO

## 2024-05-29 NOTE — HISTORY OF PRESENT ILLNESS
[de-identified] : LEFT SHOULDER SINCE 2021  FOLLOW UP  PAIN HAS GOTTEN BETTER  PAIN LEVEL: 2- 3/10  PT WAS GOING TO PHYSCIAL THERPAY 2 X WEEK    PREVIOUS HPI LOCATION: LEFT SHOULDER-RHD  DURATION: PATIENT FELL DECEMBER 15, 2021 - PT WAS AT WORK [employer], PT WAS PUSHED AND FELL FORWARD LANDED ON OUTSTRETCHED LEFT ARM SEEN IN ER - XRAYS NOT PROKEN NO FURTHER TREATMENT  MARCH 2022 - HAD MRI  - NO REPORT  SURGERY WAS OFFERED - DID NOT PROCEED SHOULDER WAS NOT PAINFUL AND WAS FUNTIONAL FLARED UP AUGUST 2023 - FROM WORKING OUT / LIFTING   INTERMITTENT PAIN ANTERIOR SHOULDER  PAIN LEVEL:5/10 WITH LIFTING  TREATMENTS: REST AGGRAVATING FACTORS: OVER HEAD LIFTING, REACHING BEHIND, EXERCISING  ASSOCIATED CLICKING/LOCKING/POPPING/GRINDING  HAS HAD 12 VISITS P.T. 2023  WITH NO RELIEF OF SHOULDER PAIN

## 2024-07-24 ENCOUNTER — NON-APPOINTMENT (OUTPATIENT)
Age: 44
End: 2024-07-24

## 2024-07-25 ENCOUNTER — APPOINTMENT (OUTPATIENT)
Dept: ORTHOPEDIC SURGERY | Facility: CLINIC | Age: 44
End: 2024-07-25

## 2024-07-25 DIAGNOSIS — M25.561 PAIN IN RIGHT KNEE: ICD-10-CM

## 2024-07-25 PROCEDURE — 20610 DRAIN/INJ JOINT/BURSA W/O US: CPT

## 2024-07-25 PROCEDURE — 99214 OFFICE O/P EST MOD 30 MIN: CPT | Mod: 25

## 2024-07-25 NOTE — PROCEDURE
[de-identified] : [id number] LOT # [id number] 1% 500MG/50ML   KENALOG-10 [place] NDC [id number] LOT # [id number] EXP 12/25 50MG/5ML  Patient given a cortisone injection into the area of greatest tenderness of the pes anserinus.  This done under sterile conditions patient felt immediate improvement.

## 2024-07-25 NOTE — HISTORY OF PRESENT ILLNESS
[de-identified] : Patient returns today continue complain of right medial knee pain just below the knee articulation she is not improved with conservative measures as her previous Celebrex and Medrol dose pack.  She had an MRI which indicated inflammation of the pes anserinus is as well as patella tendon.  She is here for repeat evaluation.

## 2024-07-25 NOTE — DISCUSSION/SUMMARY
[de-identified] : Patient I discussed how her pain is now more specific to the pes anserinus she was given a cortisone injection today she will ice the area.  Patient will go back to taking anti-inflammatories for a few days follow-up in 3 weeks time if not thoroughly moved for possible repeat injection.  This consultation lasted 30 minutes.

## 2024-07-25 NOTE — PHYSICAL EXAM
[de-identified] : Right knee exam today is fairly benign patient has some minimal tenderness compression of the patella tendon as well as patellofemoral articulation range of motion 0 to 125 degrees knee stable extra stress valgus stress in both extension and 90 degrees of flexion.  There is a moderate amount of quadriceps atrophy of the right compared to the left with near complete absence of the VMO.  Patient also has tenderness in the area of the pes anserinus.  No erythema noted.

## 2024-07-25 NOTE — REASON FOR VISIT
[Follow-Up Visit] : a follow-up visit for [Knee Pain] : knee pain [FreeTextEntry2] : acute right knee pain.she has tried PT with very minimal relief.

## 2024-07-26 RX ORDER — CELECOXIB 200 MG/1
200 CAPSULE ORAL
Qty: 30 | Refills: 0 | Status: ACTIVE | COMMUNITY
Start: 2024-07-26 | End: 1900-01-01

## 2024-08-07 ENCOUNTER — APPOINTMENT (OUTPATIENT)
Dept: ORTHOPEDIC SURGERY | Facility: CLINIC | Age: 44
End: 2024-08-07

## 2024-08-07 PROCEDURE — 99213 OFFICE O/P EST LOW 20 MIN: CPT

## 2024-08-08 NOTE — PHYSICAL EXAM
[de-identified] : Right knee  Constitutional:  The patient is healthy-appearing and in no apparent distress.   Gait: The patient ambulates with a normal gait and no limp.  Cardiovascular System:  The capillary refill is less than 2 seconds.   Skin:  There are no skin abnormalities.  Right Knee:   Bony Palpation:  There is no tenderness of the medial joint line.  There is no tenderness of the lateral joint line. There is no tenderness of the medial femoral chondyle. There is no tenderness of the lateral femoral chondyle. There is no tenderness of the tibial tubercle. There is no tenderness of the superior patella. There is no tenderness of the inferior patella. There is tenderness of the medial patellar facet. There is tenderness of the lateral patellar facet.  Soft Tissue Palpation:  There is no tenderness of the medial retinaculum. There is no tenderness of the lateral retinaculum. There is no tenderness of the quadriceps tendon. There is no tenderness of the patella tendon. There is no tenderness of the ITB. There is no tenderness of the pes anserine.  Active Range of Motion:  The range of motion at the knee actively and passively is full.  There is a tight lateral retinaculum  Special Tests:  There is a negative Apley. There is a negative Steinmanns.  There is a negative Lachman and Anterior Drawer. There is a negative Posterior Drawer.   There is no varus or valgus laxity.  Strength:  There is 4/5 hip flexion and 5/5 knee flexion and extension.    Psychiatric:  The patient demonstrates a normal mood and affect and is active and alert.  Alignment: There is external tibial rotation and femoral anteversion. [de-identified] : MRI of right knee from 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

## 2024-08-08 NOTE — ASSESSMENT
[FreeTextEntry1] : Reviewed at length with patient prior treatment persistent symptoms as well as exam consistent with tight lateral retinaculum of patellofemoral inflammation recommendation for home exercises formal physical therapy and avoidance of hyperflexion past 90 degrees ultimately if these measures fail to provide symptom relief consideration arthroscopic evaluation with lateral release

## 2024-08-08 NOTE — HISTORY OF PRESENT ILLNESS
[de-identified] : Initial visit: Right knee pain  Reason: pulled hamstring - while running ()  Duration: 2/2024 Prior studies: MRI @ [facility]  Symptoms: Pulling sensation / Ripping / Stiff Aggravating: Walking / Sitting / laying  Alleviating: icing / Resting  Pain level: 3/10 Pain medication: Celebrex  Medical Hx: none Surgical Hx: N/A Current Medication: Allergies: Amoxicillin  Physical therapy - Currently - no improvement

## 2024-08-08 NOTE — HISTORY OF PRESENT ILLNESS
[de-identified] : Initial visit: Right knee pain  Reason: pulled hamstring - while running ()  Duration: 2/2024 Prior studies: MRI @ [facility]  Symptoms: Pulling sensation / Ripping / Stiff Aggravating: Walking / Sitting / laying  Alleviating: icing / Resting  Pain level: 3/10 Pain medication: Celebrex  Medical Hx: none Surgical Hx: N/A Current Medication: Allergies: Amoxicillin  Physical therapy - Currently - no improvement

## 2024-08-08 NOTE — PHYSICAL EXAM
[de-identified] : Right knee  Constitutional:  The patient is healthy-appearing and in no apparent distress.   Gait: The patient ambulates with a normal gait and no limp.  Cardiovascular System:  The capillary refill is less than 2 seconds.   Skin:  There are no skin abnormalities.  Right Knee:   Bony Palpation:  There is no tenderness of the medial joint line.  There is no tenderness of the lateral joint line. There is no tenderness of the medial femoral chondyle. There is no tenderness of the lateral femoral chondyle. There is no tenderness of the tibial tubercle. There is no tenderness of the superior patella. There is no tenderness of the inferior patella. There is tenderness of the medial patellar facet. There is tenderness of the lateral patellar facet.  Soft Tissue Palpation:  There is no tenderness of the medial retinaculum. There is no tenderness of the lateral retinaculum. There is no tenderness of the quadriceps tendon. There is no tenderness of the patella tendon. There is no tenderness of the ITB. There is no tenderness of the pes anserine.  Active Range of Motion:  The range of motion at the knee actively and passively is full.  There is a tight lateral retinaculum  Special Tests:  There is a negative Apley. There is a negative Steinmanns.  There is a negative Lachman and Anterior Drawer. There is a negative Posterior Drawer.   There is no varus or valgus laxity.  Strength:  There is 4/5 hip flexion and 5/5 knee flexion and extension.    Psychiatric:  The patient demonstrates a normal mood and affect and is active and alert.  Alignment: There is external tibial rotation and femoral anteversion. 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

## 2024-09-12 ENCOUNTER — APPOINTMENT (OUTPATIENT)
Dept: ORTHOPEDIC SURGERY | Facility: CLINIC | Age: 44
End: 2024-09-12

## 2024-09-12 DIAGNOSIS — M94.261 CHONDROMALACIA, RIGHT KNEE: ICD-10-CM

## 2024-09-12 PROCEDURE — 20610 DRAIN/INJ JOINT/BURSA W/O US: CPT | Mod: RT

## 2024-09-12 PROCEDURE — 99213 OFFICE O/P EST LOW 20 MIN: CPT | Mod: 25

## 2024-09-12 NOTE — PHYSICAL EXAM
[de-identified] : Right knee  Constitutional:  The patient is healthy-appearing and in no apparent distress.   Gait: The patient ambulates with a normal gait and no limp.  Cardiovascular System:  The capillary refill is less than 2 seconds.   Skin:  There are no skin abnormalities.  Right Knee:   Bony Palpation:  There is no tenderness of the medial joint line.  There is no tenderness of the lateral joint line. There is no tenderness of the medial femoral chondyle. There is no tenderness of the lateral femoral chondyle. There is no tenderness of the tibial tubercle. There is no tenderness of the superior patella. There is no tenderness of the inferior patella. There is tenderness of the medial patellar facet. There is tenderness of the lateral patellar facet.  Soft Tissue Palpation:  There is no tenderness of the medial retinaculum. There is no tenderness of the lateral retinaculum. There is no tenderness of the quadriceps tendon. There is no tenderness of the patella tendon. There is no tenderness of the ITB. There is no tenderness of the pes anserine.  Active Range of Motion:  The range of motion at the knee actively and passively is full.  There is a tight lateral retinaculum  Special Tests:  There is a negative Apley. There is a negative Steinmanns.  There is a negative Lachman and Anterior Drawer. There is a negative Posterior Drawer.   There is no varus or valgus laxity.  Strength:  There is 4/5 hip flexion and 5/5 knee flexion and extension.    Psychiatric:  The patient demonstrates a normal mood and affect and is active and alert.  Alignment: There is external tibial rotation and femoral anteversion. 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

## 2024-09-12 NOTE — HISTORY OF PRESENT ILLNESS
[de-identified] : Last Visit: 8/7/2024 Reason: Right knee pain  Pain level: 8/10  Symptoms: throbbing  Physical therapy/Home exercises:  home exercises outgoing

## 2024-09-12 NOTE — PROCEDURE
[de-identified] : Patient has demonstrated limited relief from NSAIDS, rest, exercises / PT, and after discussion of the risks and benefits, the patient has elected to proceed with a corticosteroid injection into the RIGHT knee(s) via an Anterolateral site. Confirmed that the patient does not have history of prior adverse reactions, active, infections, or relevant allergies.   There was no erythema or warmth, and the skin was clear.  The skin was sterilized with alcohol and via sterile technique, the knee was injected 3 cc of 1% xylocaine with 40 mg Kenalog.  The injection was completed without complication and a bandage was applied.  The patient tolerated the procedure well and was given post-injection instructions.
no hearing difficulty/no ear pain

## 2024-09-12 NOTE — ASSESSMENT
[FreeTextEntry1] : Discussed at length with patient persistent symptoms treatment options and at this time she elects cortisone injection to the right knee discussed if persistent symptoms she may require arthroscopic lateral release although this may not fully alleviate her symptoms she will follow-up as needed

## 2025-02-05 ENCOUNTER — LABORATORY RESULT (OUTPATIENT)
Age: 45
End: 2025-02-05

## 2025-02-05 ENCOUNTER — APPOINTMENT (OUTPATIENT)
Dept: INTERNAL MEDICINE | Facility: CLINIC | Age: 45
End: 2025-02-05
Payer: COMMERCIAL

## 2025-02-05 VITALS
DIASTOLIC BLOOD PRESSURE: 79 MMHG | RESPIRATION RATE: 12 BRPM | OXYGEN SATURATION: 97 % | TEMPERATURE: 97.8 F | BODY MASS INDEX: 27.93 KG/M2 | WEIGHT: 143 LBS | SYSTOLIC BLOOD PRESSURE: 114 MMHG | HEART RATE: 66 BPM

## 2025-02-05 DIAGNOSIS — Z00.00 ENCOUNTER FOR GENERAL ADULT MEDICAL EXAMINATION W/OUT ABNORMAL FINDINGS: ICD-10-CM

## 2025-02-05 PROCEDURE — 99396 PREV VISIT EST AGE 40-64: CPT

## 2025-02-11 LAB
ALBUMIN SERPL ELPH-MCNC: 4.3 G/DL
ALP BLD-CCNC: 81 U/L
ALT SERPL-CCNC: 18 U/L
ANION GAP SERPL CALC-SCNC: 10 MMOL/L
APPEARANCE: CLEAR
AST SERPL-CCNC: 20 U/L
BILIRUB SERPL-MCNC: 0.5 MG/DL
BILIRUBIN URINE: NEGATIVE
BLOOD URINE: NEGATIVE
BUN SERPL-MCNC: 16 MG/DL
CALCIUM SERPL-MCNC: 9.3 MG/DL
CHLORIDE SERPL-SCNC: 102 MMOL/L
CHOLEST SERPL-MCNC: 154 MG/DL
CO2 SERPL-SCNC: 27 MMOL/L
COLOR: YELLOW
CREAT SERPL-MCNC: 0.84 MG/DL
EGFR: 88 ML/MIN/1.73M2
ESTIMATED AVERAGE GLUCOSE: 100 MG/DL
GLUCOSE QUALITATIVE U: NEGATIVE MG/DL
GLUCOSE SERPL-MCNC: 83 MG/DL
HBA1C MFR BLD HPLC: 5.1 %
HDLC SERPL-MCNC: 47 MG/DL
KETONES URINE: NEGATIVE MG/DL
LDLC SERPL CALC-MCNC: 85 MG/DL
LEUKOCYTE ESTERASE URINE: ABNORMAL
NITRITE URINE: NEGATIVE
NONHDLC SERPL-MCNC: 107 MG/DL
PH URINE: 7.5
POTASSIUM SERPL-SCNC: 4.8 MMOL/L
PROT SERPL-MCNC: 6.7 G/DL
PROTEIN URINE: NEGATIVE MG/DL
SODIUM SERPL-SCNC: 138 MMOL/L
SPECIFIC GRAVITY URINE: 1.01
TRIGL SERPL-MCNC: 122 MG/DL
TSH SERPL-ACNC: 2.55 UIU/ML
UROBILINOGEN URINE: 0.2 MG/DL
VIT B12 SERPL-MCNC: 638 PG/ML

## 2025-02-12 ENCOUNTER — TRANSCRIPTION ENCOUNTER (OUTPATIENT)
Age: 45
End: 2025-02-12

## 2025-02-12 LAB
25(OH)D3 SERPL-MCNC: 29.4 NG/ML
BASOPHILS # BLD AUTO: 0.05 K/UL
BASOPHILS NFR BLD AUTO: 1.4 %
EOSINOPHIL # BLD AUTO: 0.09 K/UL
EOSINOPHIL NFR BLD AUTO: 2.4 %
HCT VFR BLD CALC: 43.7 %
HGB BLD-MCNC: 14.5 G/DL
IMM GRANULOCYTES NFR BLD AUTO: 0.3 %
LYMPHOCYTES # BLD AUTO: 1.34 K/UL
LYMPHOCYTES NFR BLD AUTO: 36.2 %
MAN DIFF?: NORMAL
MCHC RBC-ENTMCNC: 29.8 PG
MCHC RBC-ENTMCNC: 33.2 G/DL
MCV RBC AUTO: 89.7 FL
MONOCYTES # BLD AUTO: 0.42 K/UL
MONOCYTES NFR BLD AUTO: 11.4 %
NEUTROPHILS # BLD AUTO: 1.79 K/UL
NEUTROPHILS NFR BLD AUTO: 48.3 %
PLATELET # BLD AUTO: 176 K/UL
RBC # BLD: 4.87 M/UL
RBC # FLD: 13.6 %
WBC # FLD AUTO: 3.7 K/UL